# Patient Record
Sex: FEMALE | Race: WHITE
[De-identification: names, ages, dates, MRNs, and addresses within clinical notes are randomized per-mention and may not be internally consistent; named-entity substitution may affect disease eponyms.]

---

## 2019-03-28 ENCOUNTER — HOSPITAL ENCOUNTER (EMERGENCY)
Dept: HOSPITAL 80 - FED | Age: 54
Discharge: HOME | End: 2019-03-28
Payer: COMMERCIAL

## 2019-03-28 VITALS — DIASTOLIC BLOOD PRESSURE: 97 MMHG | SYSTOLIC BLOOD PRESSURE: 135 MMHG
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
## 2019-03-28 DIAGNOSIS — S66.802A: ICD-10-CM
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
## 2019-03-28 DIAGNOSIS — S59.912A: Primary | ICD-10-CM

## 2019-03-28 PROCEDURE — 2W3DX1Z IMMOBILIZATION OF LEFT LOWER ARM USING SPLINT: ICD-10-PCS | Performed by: EMERGENCY MEDICINE

## 2019-03-28 NOTE — EDPHY
H & P


Stated Complaint: Left forearm injury


Time Seen by Provider: 03/28/19 16:09





- Personal History


Current Tetanus/Diphtheria Vaccine: Yes





- Medical/Surgical History


Hx Asthma: Yes


Hx Chronic Respiratory Disease: No


Hx Diabetes: No


Hx Cardiac Disease: No


Hx Renal Disease: No


Hx Cirrhosis: No


Hx Alcoholism: No


Other PMH: DM II, Left thumb arthritis





- Social History


Smoking Status: Never smoked


Constitutional: 


 Initial Vital Signs











Temperature (C)  36.5 C   03/28/19 16:01


 


Heart Rate  90   03/28/19 16:01


 


Respiratory Rate  16   03/28/19 16:01


 


Blood Pressure  163/83 H  03/28/19 16:01


 


O2 Sat (%)  94   03/28/19 16:01








 











O2 Delivery Mode               Room Air














Allergies/Adverse Reactions: 


 





Sulfa (Sulfonamide Antibiotics) Allergy (Verified 03/28/19 16:04)


 








Home Medications: 














 Medication  Instructions  Recorded


 


FLUoxetine  03/28/19


 


Hydrochlorothiazide  03/28/19


 


Hydrocodone/APAP 5/325 [Norco 1 - 2 each PO Q4-6PRN PRN #20 tab 03/28/19





5/325]  


 


Ibuprofen [Motrin] 800 mg PO Q8 #20 tab 03/28/19


 


Klor-Con  03/28/19


 


Levothyroxine  03/28/19


 


Metformin 1000 mg  03/28/19


 


Metoprolol Tartrate  03/28/19


 


Proventil  03/28/19


 


Rabeprazole Sodium  03/28/19


 


Simvastatin  03/28/19


 


Symbicort 160-4.5 Mcg Inh (*)  03/28/19














Medical Decision Making





- Diagnostics


Imaging Results: 


 Imaging Impressions





Forearm X-Ray  03/28/19 16:10


Impression:  No evidence for acute osseous abnormality left forearm.











Imaging: I viewed and interpreted images myself


ED Course/Re-evaluation: 





CHIEF COMPLAINT: Left forearm injury 





HISTORY OF PRESENT ILLNESS:  


The patient is a 55 y/o female with a history of diabetes and arthritis 

complaining of a left forearm injury. She was walking up some stairs at work 

and moved her arm in an odd way. After moving her arm she felt a "pop" in her 

left forearm and immediately had pain. Since the "pop" she has had pain and 

difficulty with flexing her left wrist and fingers. She does have a hand 

specialist that she sees for her left hand arthritis. No fever, headache, body 

aches, lightheadedness, chest pain, heart palpitations, shortness of breath, 

cough, abdominal pain, urinary or bowel complaints, numbness, paresthesias.





REVIEW OF SYSTEMS:  





A 10 point review of systems was performed and is negative with the exception 

of the elements mentioned in the history of present illness.





PHYSICAL EXAM:  





HR, BP, O2 Sat, RR.  Temp noted


General Appearance:  Alert, well hydrated, appropriate, and non-toxic appearing.


Head:  Atraumatic without scalp tenderness or obvious injury


Eyes:  Pupils equal, round, reactive to light and accommodation, EOMI, no trauma

, no injection.


Ears:  Clear bilaterally, no perforation, normal landmarks


Nose:  Atraumatic, no rhinorrhea, clear.


Throat:  There is no erythema or exudates, no lesions, normal tonsils, mucus 

membranes moist.


Neck:  Supple, 2+ carotid upstroke, nontender, no lymphadenopathy.


Respiratory:  No retractions, no distress, no wheezes, and no accessory muscle 

use.  Lungs are clear to auscultation bilaterally.


Cardiovascular:  Regular rate and rhythm, no murmurs, rubs, or gallops. 

Bilateral carotid, radial, dorsalis pedis, and posterior tibial pulses intact. 

Good capillary refill all extremities.


Gastrointestinal:  Abdomen is soft, nontender, non-distended, no masses, no 

rebound, no guarding, no peritoneal signs.


Musculoskeletal: Limited ROM of left hand and wrist flexion due to pain. 

Otherwise normal active ROM of all extremities, atraumatic.


Neurological:  Alert, appropriate, and interactive.  The patient has normal 

DTRs and non-focal cranial nerves, motor, sensory, and cerebellar exam.


Skin:  No rashes, good turgor, no nodules on palpation.





Past medical history: DM II, left thumb arthritis


Past surgical history: Denies


Family history: Denies


Social history: Employed, , lives in [place]





DIAGNOSTICS/PROCEDURES/CRITICAL CARE TIME:  





Left forearm x-ray: No evidence for acute osseous abnormality left forearm.





Procedure: Splint placement.


A orthoglass volar splint was applied to the left forearm by the techt. After 

application of the splint I returned and re-examined the patient. The splint 

was adequately immobilizing the joint and distal to the splint the patient's 

circulation and sensation was intact.





DIFFERENTIAL DIAGNOSIS:   


The differential diagnosis for the patient's arm injury included but was not 

limited to left flexion tendon soft tissue injury, fracture, ligamentous injury

, contusion, muscular strain.





MEDICAL DECISION MAKING: 


The patient is a 55 y/o female with a history of diabetes and arthritis 

presenting with a left forearm injury. After moving her arm she felt a "pop" in 

her left forearm and immediately had pain. Since the "pop" she has had pain and 

difficulty with flexing her left wrist and fingers. On exam she does have 

limited ROM of her left wrist and fingers with flexion. Left forearm x-ray 

ordered.





1643: I reviewed patient's left forearm x-ray which does not reveal any acute 

osseous injury.





1650: Reassessed patient and discussed imaging findings. I suspect she has a 

left flexion tendon soft tissue injury. Patient will be placed in a splint and 

I have prescribed her Norco for pain. She will need to follow up with a hand 

specialist. She reports that she has a hand specialist for her hand arthritis. 

Return precautions provided; patient is comfortable with this plan.








Departure





- Departure


Disposition: Home, Routine, Self-Care


Clinical Impression: 


Forearm injury


Qualifiers:


 Encounter type: initial encounter Laterality: left Qualified Code(s): S59.912A 

- Unspecified injury of left forearm, initial encounter





Injury of flexor tendon of left hand


Qualifiers:


 Encounter type: initial encounter Qualified Code(s): S66.802A - Unspecified 

injury of other specified muscles, fascia and tendons at wrist and hand level, 

left hand, initial encounter





Condition: Good


Instructions:  Tendon Rupture (ED), Arm Pain (ED)


Additional Instructions: 


1. Rest, ice, elevation.  


2. Follow up with an orthopedic hand surgeon within one week.  


3. Return to the emergency department for worsening pain, swelling, numbness, 

weakness or other concerns.  


4. Wear splint at all times until reevaluation


5. You will likely need an MRI to further evaluate your injury.  


6. Use ibuprofen in addition to prescribed pain medication as directed for pain.





Referrals: 


Callum Mittal MD [Medical Doctor] - As per Instructions


Prescriptions: 


Hydrocodone/APAP 5/325 [Norco 5/325] 1 - 2 each PO Q4-6PRN PRN #20 tab


 PRN Reason: Pain, Moderate


Ibuprofen [Motrin] 800 mg PO Q8 #20 tab


Report Scribed for: Moris Morfin


Report Scribed by: Charlene Clements


Date of Report: 03/28/19


Time of Report: 16:30